# Patient Record
Sex: MALE | Race: WHITE | NOT HISPANIC OR LATINO | Employment: FULL TIME | URBAN - METROPOLITAN AREA
[De-identification: names, ages, dates, MRNs, and addresses within clinical notes are randomized per-mention and may not be internally consistent; named-entity substitution may affect disease eponyms.]

---
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
## 2022-10-03 ENCOUNTER — HOSPITAL ENCOUNTER (OUTPATIENT)
Dept: RADIOLOGY | Facility: HOSPITAL | Age: 22
Discharge: HOME | End: 2022-10-03
Attending: NURSE PRACTITIONER
Payer: OTHER MISCELLANEOUS

## 2022-10-03 DIAGNOSIS — S93.402D SPRAIN OF UNSPECIFIED LIGAMENT OF LEFT ANKLE, SUBSEQUENT ENCOUNTER: ICD-10-CM

## 2022-10-03 PROCEDURE — 73721 MRI JNT OF LWR EXTRE W/O DYE: CPT | Mod: 26,LT,, | Performed by: RADIOLOGY

## 2022-10-03 PROCEDURE — 73721 MRI JNT OF LWR EXTRE W/O DYE: CPT | Mod: TC,LT

## 2022-10-03 PROCEDURE — 73721 MRI ANKLE WITHOUT CONTRAST LEFT: ICD-10-PCS | Mod: 26,LT,, | Performed by: RADIOLOGY

## 2024-01-02 ENCOUNTER — HOSPITAL ENCOUNTER (EMERGENCY)
Facility: HOSPITAL | Age: 24
Discharge: HOME | End: 2024-01-02
Attending: EMERGENCY MEDICINE

## 2024-01-02 VITALS
TEMPERATURE: 98 F | HEIGHT: 72 IN | DIASTOLIC BLOOD PRESSURE: 92 MMHG | HEART RATE: 97 BPM | BODY MASS INDEX: 16.93 KG/M2 | OXYGEN SATURATION: 100 % | WEIGHT: 125 LBS | RESPIRATION RATE: 19 BRPM | SYSTOLIC BLOOD PRESSURE: 131 MMHG

## 2024-01-02 DIAGNOSIS — J10.1 INFLUENZA A: Primary | ICD-10-CM

## 2024-01-02 LAB
GROUP A STREP, MOLECULAR: NEGATIVE
INFLUENZA A, MOLECULAR: POSITIVE
INFLUENZA B, MOLECULAR: NEGATIVE
SARS-COV-2 RDRP RESP QL NAA+PROBE: NEGATIVE
SPECIMEN SOURCE: ABNORMAL

## 2024-01-02 PROCEDURE — 99284 EMERGENCY DEPT VISIT MOD MDM: CPT | Mod: 25

## 2024-01-02 PROCEDURE — U0002 COVID-19 LAB TEST NON-CDC: HCPCS | Performed by: NURSE PRACTITIONER

## 2024-01-02 PROCEDURE — 87651 STREP A DNA AMP PROBE: CPT | Performed by: NURSE PRACTITIONER

## 2024-01-02 PROCEDURE — 87502 INFLUENZA DNA AMP PROBE: CPT | Performed by: NURSE PRACTITIONER

## 2024-01-02 RX ORDER — DEXCHLORPHENIRAMINE MALEATE, DEXTROMETHORPHAN HBR, PHENYLEPHRINE HCL 1; 10; 5 MG/5ML; MG/5ML; MG/5ML
10 SYRUP ORAL EVERY 6 HOURS PRN
Qty: 240 ML | Refills: 0 | Status: SHIPPED | OUTPATIENT
Start: 2024-01-02

## 2024-01-02 RX ORDER — ONDANSETRON 4 MG/1
4 TABLET, ORALLY DISINTEGRATING ORAL EVERY 8 HOURS PRN
Qty: 12 TABLET | Refills: 0 | Status: SHIPPED | OUTPATIENT
Start: 2024-01-02

## 2024-01-02 NOTE — FIRST PROVIDER EVALUATION
" Emergency Department TeleTriage Encounter Note      CHIEF COMPLAINT    Chief Complaint   Patient presents with    Influenza     Dx Flu one week ago, feels worse "it's progressed into something else"    Shortness of Breath       VITAL SIGNS   Initial Vitals [01/02/24 1444]   BP Pulse Resp Temp SpO2   120/81 90 20 98.2 °F (36.8 °C) 98 %      MAP       --            ALLERGIES    Review of patient's allergies indicates:  No Known Allergies    PROVIDER TRIAGE NOTE  Verbal consent for the teletriage evaluation was given by the patient at the start of the evaluation.  All efforts will be made to maintain patient's privacy during the evaluation.      This is a teletriage evaluation of a 23 y.o. male presenting to the ED with c/o flu-like symptoms since 12/29, negative flu test but given tamiflu due to exposure and symptoms.  Has not taken tamiflu as prescribed.  Thinks he is getting worse. Limited physical exam via telehealth: The patient is awake, alert, answering questions appropriately and is not in respiratory distress.  As the Teletriage provider, I performed an initial assessment and ordered appropriate labs and imaging studies, if any, to facilitate the patient's care once placed in the ED. Once a room is available, care and a full evaluation will be completed by an alternate ED provider.  Any additional orders and the final disposition will be determined by that provider.  All imaging and labs will not be followed-up by the Teletriage Team, including myself.          ORDERS  Labs Reviewed   SARS-COV-2 RNA AMPLIFICATION, QUAL       ED Orders (720h ago, onward)      Start Ordered     Status Ordering Provider    01/02/24 1451 01/02/24 1450  COVID-19 Rapid Screening  STAT         Ordered KAROLINE TONY    01/02/24 1451 01/02/24 1450  Influenza antigen Nasopharyngeal Swab  Once         Ordered FELICE DYE    01/02/24 1449 01/02/24 1450  X-Ray Chest PA And Lateral  1 time imaging         Ordered KAROLINE TONY      "         Virtual Visit Note: The provider triage portion of this emergency department evaluation and documentation was performed via [employer], a HIPAA-compliant telemedicine application, in concert with a tele-presenter in the room. A face to face patient evaluation with one of my colleagues will occur once the patient is placed in an emergency department room.      DISCLAIMER: This note was prepared with [employer] voice recognition transcription software. Garbled syntax, mangled pronouns, and other bizarre constructions may be attributed to that software system.

## 2024-01-02 NOTE — Clinical Note
"Craig Kendrickhan" Chelsi was seen and treated in our emergency department on 1/2/2024.  He may return to work on 01/08/2024.       If you have any questions or concerns, please don't hesitate to call.      Jessica Hammond NP" Private car

## 2024-01-02 NOTE — ED PROVIDER NOTES
"Encounter Date: 1/2/2024       History     Chief Complaint   Patient presents with    Influenza     Dx Flu one week ago, feels worse "it's progressed into something else"    Shortness of Breath     Craig Gagnon is a 23 year male with no pmh presenting to the ED with c/o body aches, nasal congestion, fatigue, body aches, fever x 3 days. He has had a decreased appetite but has been tolerating PO intake  with no vomiting or diarrhea. He has taken unknown OTC cold/flu medication. He had a negative flu test on 12-29-23.       Review of patient's allergies indicates:  No Known Allergies  No past medical history on file.  No past surgical history on file.  Family History   Problem Relation Age of Onset    Diabetes Maternal Grandmother     Hypertension Maternal Grandmother      Social History     Tobacco Use    Smoking status: Every Day    Smokeless tobacco: Never   Substance Use Topics    Alcohol use: Yes     Comment: occ    Drug use: Yes     Types: Marijuana     Review of Systems   Constitutional:  Positive for chills, fatigue and fever.   HENT:  Positive for congestion and rhinorrhea. Negative for sore throat.    Respiratory:  Negative for shortness of breath.    Cardiovascular:  Negative for chest pain.   Gastrointestinal:  Negative for diarrhea, nausea and vomiting.   Genitourinary:  Negative for dysuria.   Musculoskeletal:  Negative for back pain.   Skin:  Negative for rash.   Neurological:  Negative for weakness.   Hematological:  Does not bruise/bleed easily.       Physical Exam     Initial Vitals [01/02/24 1444]   BP Pulse Resp Temp SpO2   120/81 90 20 98.2 °F (36.8 °C) 98 %      MAP       --         Physical Exam    Nursing note and vitals reviewed.  Constitutional: He appears well-developed and well-nourished. He is not diaphoretic. No distress.   HENT:   Head: Normocephalic and atraumatic.   Mouth/Throat: Oropharynx is clear and moist.   Eyes: Conjunctivae are normal.   Neck: Neck supple.   Cardiovascular:  " Normal rate, regular rhythm, normal heart sounds and intact distal pulses.     Exam reveals no gallop and no friction rub.       No murmur heard.  Pulmonary/Chest: Breath sounds normal. He has no wheezes. He has no rhonchi. He has no rales.   Abdominal: Abdomen is soft. He exhibits no distension. There is no abdominal tenderness.   Musculoskeletal:         General: Normal range of motion.      Cervical back: Neck supple.     Neurological: He is alert and oriented to person, place, and time.   Skin: No rash noted. No erythema.         ED Course   Procedures  Labs Reviewed   INFLUENZA A AND B ANTIGEN - Abnormal; Notable for the following components:       Result Value    Influenza A, Molecular Positive (*)     All other components within normal limits    Narrative:     Specimen Source->Nasopharyngeal Swab  CALLED/READ BACK ER ALEJANDRO CRISOSTOMO 1/2/24 1619 JT   GROUP A STREP, MOLECULAR   SARS-COV-2 RNA AMPLIFICATION, QUAL          Imaging Results              X-Ray Chest PA And Lateral (Final result)  Result time 01/02/24 15:23:48      Final result by Bob Jacobson MD (01/02/24 15:23:48)                   Narrative:    CHEST PA AND LATERAL    CLINICAL DATA:  Cough, congestion    FINDINGS: 2 views demonstrate no cardiac, pulmonary, or osseous abnormalities.    IMPRESSION:  1. Normal two-view chest.    Electronically signed by:  Bob Jacobson MD  01/02/2024 03:23 PM [facility] Workstation: [id number]                                     Medications - No data to display  Medical Decision Making  This is an urgent evaluation of a 23 year old male presenting to the ED with upper respiratory symptoms.  He is positive for influenza A, negative strep and COVID. The patient appears to have a viral upper respiratory infection.  Based upon the history and physical exam the patient does not appear to have a serious bacterial infection such as pneumonia, sepsis, otitis media, bacterial sinusitis, strep pharyngitis, parapharyngeal or  peritonsillar abscess, meningitis. CXR shows no infiltrate consistent with pneumonia. Patient appears very well and I have given specific return precautions to the patient and/or family members.  The patient can take over the counter medications and does not appear to need antibiotics at this time. Based on my clinical evaluation, I do not appreciate any immediate, emergent, or life threatening condition or etiology that warrants additional workup today and feel that the patient can be discharged with close follow up care.       Amount and/or Complexity of Data Reviewed  Labs: ordered.    Risk  OTC drugs.  Prescription drug management.                                      Clinical Impression:  Final diagnoses:  [J10.1] Influenza A (Primary)          ED Disposition Condition    Discharge Stable          ED Prescriptions       Medication Sig Dispense Start Date End Date Auth. Provider    ondansetron (ZOFRAN-ODT) 4 MG TbDL Take 1 tablet (4 mg total) by mouth every 8 (eight) hours as needed (nausea). 12 tablet 1/2/2024 -- Jessica Hammond NP    dexchlorphen-phenylephrine-DM (POLYTUSSIN DM,DEXCHLORPHENRMN,) 1-5-10 mg/5 mL Syrp Take 10 mLs by mouth every 6 (six) hours as needed (cough and congestion). 240 mL 1/2/2024 -- Jessica Hammond NP          Follow-up Information       Follow up With Specialties Details Why Contact Info Additional Information    [facility] - Emergency Dept Emergency Medicine  As needed, If symptoms worsen [address] [address]  [phone] 1st floor             Jessica Hammond NP  01/02/24 8025

## 2024-07-13 ENCOUNTER — HOSPITAL ENCOUNTER (EMERGENCY)
Facility: HOSPITAL | Age: 24
Discharge: HOME | End: 2024-07-13
Attending: EMERGENCY MEDICINE

## 2024-07-13 VITALS
TEMPERATURE: 98 F | DIASTOLIC BLOOD PRESSURE: 80 MMHG | HEIGHT: 72 IN | RESPIRATION RATE: 16 BRPM | OXYGEN SATURATION: 99 % | HEART RATE: 70 BPM | WEIGHT: 140 LBS | BODY MASS INDEX: 18.96 KG/M2 | SYSTOLIC BLOOD PRESSURE: 121 MMHG

## 2024-07-13 DIAGNOSIS — U07.1 COVID-19: Primary | ICD-10-CM

## 2024-07-13 LAB — SARS-COV-2 RDRP RESP QL NAA+PROBE: POSITIVE

## 2024-07-13 PROCEDURE — U0002 COVID-19 LAB TEST NON-CDC: HCPCS | Performed by: NURSE PRACTITIONER

## 2024-07-13 PROCEDURE — 99282 EMERGENCY DEPT VISIT SF MDM: CPT

## 2024-07-13 NOTE — FIRST PROVIDER EVALUATION
Emergency Department TeleTriage Encounter Note      CHIEF COMPLAINT    Chief Complaint   Patient presents with    COVID-19 Concerns     Wants to be tested for covid, sinus congestion, headache, fatigue, fever       VITAL SIGNS   Initial Vitals [07/13/24 1133]   BP Pulse Resp Temp SpO2   121/80 70 16 97.5 °F (36.4 °C) 99 %      MAP       --            ALLERGIES    Review of patient's allergies indicates:  No Known Allergies    PROVIDER TRIAGE NOTE  Verbal consent for the teletriage evaluation was given by the patient at the start of the evaluation.  All efforts will be made to maintain patient's privacy during the evaluation.      This is a teletriage evaluation of a 24 y.o. male presenting to the ED with c/o fever and COVID for several days.  Recent exposure. Limited physical exam via telehealth: The patient is awake, alert, answering questions appropriately and is not in respiratory distress.  As the Teletriage provider, I performed an initial assessment and ordered appropriate labs and imaging studies, if any, to facilitate the patient's care once placed in the ED. Once a room is available, care and a full evaluation will be completed by an alternate ED provider.  Any additional orders and the final disposition will be determined by that provider.  All imaging and labs will not be followed-up by the Teletriage Team, including myself.          ORDERS  Labs Reviewed   SARS-COV-2 RNA AMPLIFICATION, QUAL       ED Orders (720h ago, onward)      Start Ordered     Status Ordering Provider    07/13/24 1138 07/13/24 1137  COVID-19 Rapid Screening  STAT         Ordered KAROLINE TONY A              Virtual Visit Note: The provider triage portion of this emergency department evaluation and documentation was performed via [email or web address], a HIPAA-compliant telemedicine application, in concert with a tele-presenter in the room. A face to face patient evaluation with one of my colleagues will occur once the patient is placed in an  emergency department room.      DISCLAIMER: This note was prepared with [employer] voice recognition transcription software. Garbled syntax, mangled pronouns, and other bizarre constructions may be attributed to that software system.

## 2024-07-13 NOTE — Clinical Note
"Craig"David Gagnon was seen and treated in our emergency department on 7/13/2024.  He may return to work on 07/17/2024.       If you have any questions or concerns, please don't hesitate to call.      Nohemi Quiñones, SLOAN"

## 2024-07-13 NOTE — DISCHARGE INSTRUCTIONS
Alternate Motrin and Tylenol for fever  Follow-up as directed   Home quarantine for the next 4 days  Return for any concerns

## 2024-07-13 NOTE — ED PROVIDER NOTES
Encounter Date: 7/13/2024       History     Chief Complaint   Patient presents with    COVID-19 Concerns     Wants to be tested for covid, sinus congestion, headache, fatigue, fever     24-year-old male with no significant medical history presents emergency department complaint of sinus congestion, headache, fatigue low-grade fever that started yesterday.  Patient had known exposure to his and who recently tested positive for COVID.  Patient denies any associated chest pain or shortness of breath.    The history is provided by the patient.     Review of patient's allergies indicates:  No Known Allergies  History reviewed. No pertinent past medical history.  History reviewed. No pertinent surgical history.  Family History   Problem Relation Name Age of Onset    Diabetes Maternal Grandmother      Hypertension Maternal Grandmother       Social History     Tobacco Use    Smoking status: Every Day     Types: Vaping with nicotine    Smokeless tobacco: Never   Substance Use Topics    Alcohol use: Yes     Comment: occ    Drug use: Yes     Types: Marijuana     Review of Systems   Constitutional:  Positive for chills, fatigue and fever.   Respiratory:  Positive for cough. Negative for shortness of breath.    Cardiovascular:  Negative for chest pain, palpitations and leg swelling.   Gastrointestinal:  Negative for abdominal pain.   Genitourinary: Negative.    Musculoskeletal: Negative.    Neurological:  Positive for headaches.   Hematological: Negative.    Psychiatric/Behavioral: Negative.         Physical Exam     Initial Vitals [07/13/24 1133]   BP Pulse Resp Temp SpO2   121/80 70 16 97.5 °F (36.4 °C) 99 %      MAP       --         Physical Exam    Nursing note and vitals reviewed.  Constitutional: He appears well-developed and well-nourished.   HENT:   Head: Normocephalic and atraumatic.   Eyes: EOM are normal. Pupils are equal, round, and reactive to light.   Neck: Neck supple.   Normal range of motion.  Cardiovascular:   Normal rate, regular rhythm, normal heart sounds and intact distal pulses.           Pulmonary/Chest: Breath sounds normal. No respiratory distress. He exhibits no tenderness.   Abdominal: Abdomen is soft. Bowel sounds are normal. He exhibits no distension. There is no abdominal tenderness.   Musculoskeletal:      Cervical back: Normal range of motion and neck supple.     Neurological: He is alert and oriented to person, place, and time. He has normal strength. GCS score is 15. GCS eye subscore is 4. GCS verbal subscore is 5. GCS motor subscore is 6.   Skin: Skin is warm. No rash noted.   Psychiatric: He has a normal mood and affect.         ED Course   Procedures  Labs Reviewed   SARS-COV-2 RNA AMPLIFICATION, QUAL - Abnormal; Notable for the following components:       Result Value    SARS-CoV-2 RNA, Amplification, Qual Positive (*)     All other components within normal limits          Imaging Results    None          Medications - No data to display  Medical Decision Making  24-year-old male with no significant medical history presents emergency department complaint of sinus congestion, headache, fatigue low-grade fever that started yesterday.  Patient had known exposure to his and who recently tested positive for COVID.  Patient denies any associated chest pain or shortness of breath.    The history is provided by the patient.     Considerations include but not limited to, COVID, influenza, viral URI    24-year-old male presents emergency department with complaint of sinus congestion headache fatigue had known exposure to someone recently who tested positive for COVID patient is COVID positive here he has no chest pain shortness of breath oxygenating well on room air.  Clear bilateral breath sounds noted patient will be discharged home he was instructed on home quarantine for the next 5 days given return precautions.                                      Clinical Impression:  Final diagnoses:  [U07.1] COVID-19  (Primary)          ED Disposition Condition    Discharge Stable          ED Prescriptions    None       Follow-up Information       Follow up With Specialties Details Why Contact Info    [facility]  Schedule an appointment as soon as possible for a visit in 3 days  [facility] [address] [place] [address]  [phone]               Nohemi Quiñones, SLOAN  07/13/24 2112